# Patient Record
(demographics unavailable — no encounter records)

---

## 2024-12-17 NOTE — HISTORY OF PRESENT ILLNESS
[FreeTextEntry1] : 33yo  presents for pelvic pain for 12 years Started with second pregnancy with symptoms progesses with each pregancy. Improved shortly after delivery but recurs after half a year. She described the pain is localized in the left groin, throbbing in quality without radiation, cyclical, postcotidal  and aggravated by prolonged standing. Endorses labia varicosities during pregnancy but resolved with delivery. Denies leg swelling or varicose veins.  MRI pelvis with and without contrast in 2021 without evidence of endometriosis. She underwent laparoscopic procedure without evidence of endometriosis.

## 2024-12-17 NOTE — HISTORY OF PRESENT ILLNESS
[FreeTextEntry1] : 35yo  presents for pelvic pain for 12 years Started with second pregnancy with symptoms progesses with each pregancy. Improved shortly after delivery but recurs after half a year. She described the pain is localized in the left groin, throbbing in quality without radiation, cyclical, postcotidal  and aggravated by prolonged standing. Endorses labia varicosities during pregnancy but resolved with delivery. Denies leg swelling or varicose veins.  MRI pelvis with and without contrast in 2021 without evidence of endometriosis. She underwent laparoscopic procedure without evidence of endometriosis.

## 2024-12-17 NOTE — END OF VISIT
[FreeTextEntry3] : I, Dr. Ruiz, personally performed the evaluation and management (E/M) services for this established patient who presents today with (a) new problem(s)/exacerbation of (an) existing condition(s). That E/M includes conducting the examination, assessing all new/exacerbated conditions, and establishing a new plan of care. Today, my PA, Bonnie Parrish PA-C, was here to observe my evaluation and management services for this new problem/exacerbated condition to be followed going forward. [Time Spent: ___ minutes] : I have spent [unfilled] minutes of time on the encounter which excludes teaching and separately reported services.

## 2024-12-17 NOTE — PHYSICAL EXAM
[Alert] : alert [No Acute Distress] : no acute distress [Well Nourished] : well nourished [Well Developed] : well developed [Normal Sclera/Conjunctiva] : normal sclera/conjunctiva [EOMI] : extra occular movement intact [No Proptosis] : no proptosis [Normal Oropharynx] : the oropharynx was normal [No Respiratory Distress] : no respiratory distress [No Accessory Muscle Use] : no accessory muscle use [Normal Rate] : heart rate was normal  [Normal S1, S2] : normal S1 and S2 [Regular Rhythm] : with a regular rhythm [No Edema] : there was no peripheral edema [Normal Bowel Sounds] : normal bowel sounds [Not Tender] : non-tender [Soft] : abdomen soft [Not Distended] : not distended [No Spinal Tenderness] : no spinal tenderness [Spine Straight] : spine straight [No Stigmata of Cushings Syndrome] : no stigmata of cushings syndrome [Normal Gait] : normal gait [Normal Strength/Tone] : muscle strength and tone were normal [No Rash] : no rash [Normal Reflexes] : deep tendon reflexes were 2+ and symmetric [No Tremors] : no tremors [Oriented x3] : oriented to person, place, and time [Fully active, able to carry on all pre-disease performance without restriction] : Fully active, able to carry on all pre-disease performance without restriction [Acanthosis Nigricans___] : no acanthosis nigricans

## 2024-12-17 NOTE — ASSESSMENT
[FreeTextEntry1] : This is a 34 year old female  referred for possible ovarian vein syndrome. Her symptoms consist of pain in the left lower quadrant which has gotten progressively worse with each pregnancy. She also has some labial swelling and left leg swelling during her pregnancies. She said she does have pain even when she is not pregnant which is intermittent and not particularly positional. She had an MRI done which showed some prominence of the pelvic venous structures but not dramatic. She is otherwise in good health and does wish to have further pregnancies. I explained the anatomy of pelvic congestion syndrome and told her that the only way to be certain of the diagnosis would be to do an ovarian venogram and if a large or incompetent ovarian vein was found it would be embolized at that time. She does wish to go ahead with the procedure, and we will schedule this in the near future.

## 2025-04-01 NOTE — PHYSICAL EXAM
[Alert] : alert [No Acute Distress] : no acute distress [Well Nourished] : well nourished [Well Developed] : well developed [Normal Sclera/Conjunctiva] : normal sclera/conjunctiva [EOMI] : extra occular movement intact [No Proptosis] : no proptosis [Normal Oropharynx] : the oropharynx was normal [Thyroid Not Enlarged] : the thyroid was not enlarged [No Thyroid Nodules] : there were no palpable thyroid nodules [No Respiratory Distress] : no respiratory distress [Pedal Pulses Normal] : the pedal pulses are present [No Edema] : there was no peripheral edema [Normal Bowel Sounds] : normal bowel sounds [Not Tender] : non-tender [Soft] : abdomen soft [Not Distended] : not distended [No Spinal Tenderness] : no spinal tenderness [Spine Straight] : spine straight [No Stigmata of Cushings Syndrome] : no stigmata of cushings syndrome [Normal Gait] : normal gait [Normal Strength/Tone] : muscle strength and tone were normal [No Rash] : no rash [Acanthosis Nigricans___] : no acanthosis nigricans [Normal Reflexes] : deep tendon reflexes were 2+ and symmetric [No Motor Deficits] : the motor exam was normal [No Tremors] : no tremors [No Sensory Deficits] : the sensory exam was normal to light touch and pinprick [Oriented x3] : oriented to person, place, and time [Normal Insight/Judgement] : insight and judgment were intact [Normal Affect] : the affect was normal [Normal Mood] : the mood was normal [Recent Memory Normal] : recent memory was not impaired

## 2025-04-01 NOTE — ASSESSMENT
[FreeTextEntry1] : This is a 34 year old year old female  two months status post left ovarian vein embolization for pelvic congestion syndrome. Her primary complaint was pain in the left lower quadrant becoming worse with each pregnancy and worse on standing. She had had significant gynecologic workup previously including ruling out endometriosis or ovarian issues. She had an MRI which showed a dilated left ovarian vein. She underwent ovarian vein embolization using STS foam and platinum coils with a good angiographic result. She said that her symptoms resolved for about two weeks after the procedure and then recurred and are now back to baseline again. On physical exam there is some localized tenderness in the left lower quadrant. I did an ultrasound and didn't see anything significant in that area. I told her that either her pain was likely not due to the ovarian vein or there was possibly some recanalization or a missed tributary vein. The latter possibilities could only be ruled out with a repeat ovarian venogram and I suggested we try this since she has had such an extensive work up already. The other less likely possibility is that this could be a GI issue such as diverticulitis/diverticulosis or possibly adhesion from prior surgery. She has not had any specific workup for that but I told her that if the repeat ovarian venogram was negative that that would be a reasonable step. She does wish to go ahead with the venogram and we will get her scheduled.

## 2025-04-01 NOTE — HISTORY OF PRESENT ILLNESS
[FreeTextEntry1] : 33yo  with pelvic congestion syndrome who is now 9 weeks s/p left ovarian vein embolization on 2025 who presents for follow up visit